# Patient Record
Sex: FEMALE | Employment: OTHER
[De-identification: names, ages, dates, MRNs, and addresses within clinical notes are randomized per-mention and may not be internally consistent; named-entity substitution may affect disease eponyms.]

---

## 2020-10-01 ENCOUNTER — OFFICE VISIT (OUTPATIENT)
Age: 70
End: 2020-10-01

## 2020-10-13 ENCOUNTER — OFFICE VISIT (OUTPATIENT)
Dept: URBAN - METROPOLITAN AREA CLINIC 9 | Facility: CLINIC | Age: 70
End: 2020-10-13

## 2021-02-23 ENCOUNTER — TELEPHONE ENCOUNTER (OUTPATIENT)
Dept: URBAN - METROPOLITAN AREA CLINIC 9 | Facility: CLINIC | Age: 71
End: 2021-02-23

## 2021-02-24 ENCOUNTER — TELEPHONE ENCOUNTER (OUTPATIENT)
Dept: URBAN - METROPOLITAN AREA CLINIC 9 | Facility: CLINIC | Age: 71
End: 2021-02-24

## 2021-02-25 ENCOUNTER — OFFICE VISIT (OUTPATIENT)
Dept: URBAN - METROPOLITAN AREA CLINIC 9 | Facility: CLINIC | Age: 71
End: 2021-02-25

## 2021-02-25 ENCOUNTER — TELEPHONE ENCOUNTER (OUTPATIENT)
Dept: URBAN - METROPOLITAN AREA CLINIC 9 | Facility: CLINIC | Age: 71
End: 2021-02-25

## 2021-03-08 ENCOUNTER — TELEPHONE ENCOUNTER (OUTPATIENT)
Dept: URBAN - METROPOLITAN AREA CLINIC 9 | Facility: CLINIC | Age: 71
End: 2021-03-08

## 2021-04-03 ENCOUNTER — TELEPHONE ENCOUNTER (OUTPATIENT)
Dept: URBAN - METROPOLITAN AREA CLINIC 9 | Facility: CLINIC | Age: 71
End: 2021-04-03

## 2021-04-05 ENCOUNTER — TELEPHONE ENCOUNTER (OUTPATIENT)
Dept: URBAN - METROPOLITAN AREA CLINIC 9 | Facility: CLINIC | Age: 71
End: 2021-04-05

## 2021-04-05 ENCOUNTER — OFFICE VISIT (OUTPATIENT)
Dept: URBAN - METROPOLITAN AREA SURGERY CENTER 9 | Facility: SURGERY CENTER | Age: 71
End: 2021-04-05

## 2021-04-26 ENCOUNTER — OFFICE VISIT (OUTPATIENT)
Dept: URBAN - METROPOLITAN AREA SURGERY CENTER 9 | Facility: SURGERY CENTER | Age: 71
End: 2021-04-26

## 2021-06-03 ENCOUNTER — OFFICE VISIT (OUTPATIENT)
Dept: URBAN - METROPOLITAN AREA CLINIC 9 | Facility: CLINIC | Age: 71
End: 2021-06-03

## 2021-07-08 ENCOUNTER — TELEPHONE ENCOUNTER (OUTPATIENT)
Dept: URBAN - METROPOLITAN AREA CLINIC 9 | Facility: CLINIC | Age: 71
End: 2021-07-08

## 2022-07-30 ENCOUNTER — TELEPHONE ENCOUNTER (OUTPATIENT)
Age: 72
End: 2022-07-30

## 2022-07-30 RX ORDER — CIPROFLOXACIN HYDROCHLORIDE 500 MG/1
1 (ONE) TABLET, FILM COATED ORAL BID
Qty: 0 | Refills: 2 | OUTPATIENT
Start: 2021-02-24 | End: 2021-03-03

## 2022-07-30 RX ORDER — ALUMINUM HYDROXIDE AND MAGNESIUM CARBONATE 95; 358 MG/15ML; MG/15ML
15ML LIQUID ORAL
Qty: 0 | Refills: 2 | OUTPATIENT
Start: 2017-03-27 | End: 2017-04-26

## 2022-07-30 RX ORDER — PANTOPRAZOLE SODIUM 40 MG/1
1 (ONE) TABLET, DELAYED RELEASE ORAL DAILY
Qty: 0 | Refills: 3 | OUTPATIENT
Start: 2020-10-13 | End: 2021-06-03

## 2022-07-30 RX ORDER — FAMOTIDINE 10 MG
1 (ONE) TABLET ORAL
Qty: 0 | Refills: 16 | OUTPATIENT
Start: 2016-07-01 | End: 2016-07-18

## 2022-07-30 RX ORDER — METRONIDAZOLE 375 MG/1
1 (ONE) CAPSULE ORAL TID
Qty: 0 | Refills: 2 | OUTPATIENT
Start: 2021-02-24 | End: 2021-03-03

## 2022-07-30 RX ORDER — FAMOTIDINE 10 MG
1 (ONE) TABLET ORAL TWICE A DAY
Qty: 0 | Refills: 16 | OUTPATIENT
Start: 2017-05-31 | End: 2020-10-13

## 2022-07-30 RX ORDER — PANTOPRAZOLE SODIUM 40 MG/1
1 (ONE) TABLET, DELAYED RELEASE ORAL
Qty: 0 | Refills: 16 | OUTPATIENT
Start: 2017-03-27 | End: 2017-03-27

## 2022-07-31 ENCOUNTER — TELEPHONE ENCOUNTER (OUTPATIENT)
Age: 72
End: 2022-07-31

## 2022-07-31 RX ORDER — METRONIDAZOLE 375 MG/1
1 (ONE) CAPSULE ORAL TID
Qty: 0 | Refills: 2 | Status: ACTIVE | COMMUNITY
Start: 2021-02-24

## 2022-07-31 RX ORDER — FAMOTIDINE 10 MG
1 (ONE) TABLET ORAL TWICE A DAY
Qty: 0 | Refills: 16 | Status: ACTIVE | COMMUNITY
Start: 2017-05-31

## 2022-07-31 RX ORDER — PANTOPRAZOLE SODIUM 40 MG/1
1 (ONE) TABLET, DELAYED RELEASE ORAL
Qty: 0 | Refills: 16 | Status: ACTIVE | COMMUNITY
Start: 2017-02-24

## 2022-07-31 RX ORDER — FAMOTIDINE 10 MG
1 (ONE) TABLET ORAL AT BEDTIME
Qty: 0 | Refills: 16 | Status: ACTIVE | COMMUNITY
Start: 2017-02-24

## 2022-07-31 RX ORDER — ALUMINUM HYDROXIDE AND MAGNESIUM CARBONATE 95; 358 MG/15ML; MG/15ML
15ML LIQUID ORAL
Qty: 0 | Refills: 2 | Status: ACTIVE | COMMUNITY
Start: 2017-03-27

## 2022-07-31 RX ORDER — FAMOTIDINE 10 MG
1 (ONE) TABLET ORAL
Qty: 0 | Refills: 16 | Status: ACTIVE | COMMUNITY
Start: 2016-07-01

## 2022-07-31 RX ORDER — CIPROFLOXACIN HYDROCHLORIDE 500 MG/1
1 (ONE) TABLET, FILM COATED ORAL BID
Qty: 0 | Refills: 2 | Status: ACTIVE | COMMUNITY
Start: 2021-02-24

## 2022-07-31 RX ORDER — PANTOPRAZOLE SODIUM 40 MG/1
1 (ONE) TABLET, DELAYED RELEASE ORAL DAILY
Qty: 0 | Refills: 3 | Status: ACTIVE | COMMUNITY
Start: 2020-10-13

## 2022-07-31 RX ORDER — FAMOTIDINE 10 MG
1 (ONE) TABLET ORAL TWICE A DAY
Qty: 0 | Refills: 16 | Status: ACTIVE | COMMUNITY
Start: 2017-03-27

## 2022-07-31 RX ORDER — PANTOPRAZOLE 20 MG/1
1 (ONE) TABLET, DELAYED RELEASE ORAL
Qty: 0 | Refills: 16 | Status: ACTIVE | COMMUNITY
Start: 2016-07-18

## 2022-12-30 ENCOUNTER — TELEPHONE ENCOUNTER (OUTPATIENT)
Dept: URBAN - METROPOLITAN AREA CLINIC 9 | Facility: CLINIC | Age: 72
End: 2022-12-30

## 2023-02-06 ENCOUNTER — OFFICE VISIT (OUTPATIENT)
Dept: URBAN - METROPOLITAN AREA CLINIC 9 | Facility: CLINIC | Age: 73
End: 2023-02-06
Payer: MEDICARE

## 2023-02-06 ENCOUNTER — DASHBOARD ENCOUNTERS (OUTPATIENT)
Age: 73
End: 2023-02-06

## 2023-02-06 VITALS
DIASTOLIC BLOOD PRESSURE: 82 MMHG | WEIGHT: 165 LBS | BODY MASS INDEX: 29.23 KG/M2 | SYSTOLIC BLOOD PRESSURE: 132 MMHG | HEIGHT: 63 IN

## 2023-02-06 DIAGNOSIS — R10.9 ABDOMINAL PAIN: ICD-10-CM

## 2023-02-06 DIAGNOSIS — K52.9 CHRONIC DIARRHEA: ICD-10-CM

## 2023-02-06 PROCEDURE — 99204 OFFICE O/P NEW MOD 45 MIN: CPT | Performed by: INTERNAL MEDICINE

## 2023-02-06 RX ORDER — ROSUVASTATIN CALCIUM 10 MG/1
TABLET, FILM COATED ORAL
Qty: 90 TABLET | Status: ACTIVE | COMMUNITY

## 2023-02-06 RX ORDER — CIPROFLOXACIN HYDROCHLORIDE 500 MG/1
1 (ONE) TABLET, FILM COATED ORAL BID
Qty: 0 | Refills: 2 | Status: ON HOLD | COMMUNITY
Start: 2021-02-24

## 2023-02-06 RX ORDER — SODIUM, POTASSIUM,MAG SULFATES 17.5-3.13G
177ML SOLUTION, RECONSTITUTED, ORAL ORAL ONCE
Qty: 1 | Refills: 0 | OUTPATIENT
Start: 2023-02-06 | End: 2023-02-07

## 2023-02-06 RX ORDER — PANTOPRAZOLE SODIUM 40 MG/1
1 (ONE) TABLET, DELAYED RELEASE ORAL
Qty: 0 | Refills: 16 | Status: ACTIVE | COMMUNITY
Start: 2017-02-24

## 2023-02-06 RX ORDER — FLUTICASONE PROPIONATE 50 UG/1
SPRAY 2 SPRAYS BY NASAL ROUTE DAILY SPRAY, METERED NASAL
Qty: 16 GRAM | Refills: 0 | Status: ACTIVE | COMMUNITY

## 2023-02-06 RX ORDER — METRONIDAZOLE 375 MG/1
1 (ONE) CAPSULE ORAL TID
Qty: 0 | Refills: 2 | Status: ON HOLD | COMMUNITY
Start: 2021-02-24

## 2023-02-06 RX ORDER — PANTOPRAZOLE 20 MG/1
1 (ONE) TABLET, DELAYED RELEASE ORAL
Qty: 0 | Refills: 16 | Status: ON HOLD | COMMUNITY
Start: 2016-07-18

## 2023-02-06 RX ORDER — ALUMINUM HYDROXIDE AND MAGNESIUM CARBONATE 95; 358 MG/15ML; MG/15ML
15ML LIQUID ORAL
Qty: 0 | Refills: 2 | Status: ON HOLD | COMMUNITY
Start: 2017-03-27

## 2023-02-06 RX ORDER — FAMOTIDINE 10 MG
1 (ONE) TABLET ORAL TWICE A DAY
Qty: 0 | Refills: 16 | Status: ON HOLD | COMMUNITY
Start: 2017-03-27

## 2023-02-06 NOTE — HPI-TODAY'S VISIT:
72-year-old here for 2 separate complaints.  She says she has this kind of chronic left lower quadrant abdominal discomfort.  Is really just discomfort and she drinks a lot of water to try to avoid there being pain there.  She was recently put on antibiotics for possible diverticulitis although there was no imaging I think recently.  She just kind of has this intermittent left lower quadrant discomfort.  She says once a week she will get these "flares ".  Where she has loose stools up to 10 times a day and urgency.  No new medications.  No weight loss no hematochezia.

## 2023-02-08 ENCOUNTER — TELEPHONE ENCOUNTER (OUTPATIENT)
Dept: URBAN - METROPOLITAN AREA CLINIC 9 | Facility: CLINIC | Age: 73
End: 2023-02-08

## 2023-02-08 ENCOUNTER — WEB ENCOUNTER (OUTPATIENT)
Dept: URBAN - METROPOLITAN AREA SURGERY CENTER 9 | Facility: SURGERY CENTER | Age: 73
End: 2023-02-08

## 2023-02-10 ENCOUNTER — TELEPHONE ENCOUNTER (OUTPATIENT)
Dept: URBAN - METROPOLITAN AREA CLINIC 7 | Facility: CLINIC | Age: 73
End: 2023-02-10

## 2023-02-10 LAB
DEAMIDATED GLIADIN ABS, IGA: 6
DEAMIDATED GLIADIN ABS, IGG: 4
ENDOMYSIAL ANTIBODY IGA: NEGATIVE
IMMUNOGLOBULIN A, QN, SERUM: 198
T-TRANSGLUTAMINASE (TTG) IGA: <2
T-TRANSGLUTAMINASE (TTG) IGG: 1

## 2023-02-14 ENCOUNTER — OFFICE VISIT (OUTPATIENT)
Dept: URBAN - METROPOLITAN AREA SURGERY CENTER 9 | Facility: SURGERY CENTER | Age: 73
End: 2023-02-14
Payer: MEDICARE

## 2023-02-14 ENCOUNTER — CLAIMS CREATED FROM THE CLAIM WINDOW (OUTPATIENT)
Dept: URBAN - METROPOLITAN AREA CLINIC 4 | Facility: CLINIC | Age: 73
End: 2023-02-14
Payer: MEDICARE

## 2023-02-14 DIAGNOSIS — R19.7 ACUTE DIARRHEA: ICD-10-CM

## 2023-02-14 DIAGNOSIS — K64.8 EXTERNAL HEMORRHOIDS: ICD-10-CM

## 2023-02-14 DIAGNOSIS — K63.89 OTHER SPECIFIED DISEASES OF INTESTINE: ICD-10-CM

## 2023-02-14 DIAGNOSIS — D17.5 BENIGN LIPOMATOUS NEOPLASM OF INTRA-ABDOMINAL ORGANS: ICD-10-CM

## 2023-02-14 DIAGNOSIS — D17.9 BENIGN LIPOMATOUS NEOPLASM, UNSPECIFIED: ICD-10-CM

## 2023-02-14 PROCEDURE — 43239 EGD BIOPSY SINGLE/MULTIPLE: CPT | Performed by: INTERNAL MEDICINE

## 2023-02-14 PROCEDURE — 88313 SPECIAL STAINS GROUP 2: CPT | Performed by: PATHOLOGY

## 2023-02-14 PROCEDURE — 88305 TISSUE EXAM BY PATHOLOGIST: CPT | Performed by: PATHOLOGY

## 2023-02-14 PROCEDURE — 88342 IMHCHEM/IMCYTCHM 1ST ANTB: CPT | Performed by: PATHOLOGY

## 2023-02-14 RX ORDER — FAMOTIDINE 10 MG
1 (ONE) TABLET ORAL TWICE A DAY
Qty: 0 | Refills: 16 | Status: ON HOLD | COMMUNITY
Start: 2017-03-27

## 2023-02-14 RX ORDER — PANTOPRAZOLE 20 MG/1
1 (ONE) TABLET, DELAYED RELEASE ORAL
Qty: 0 | Refills: 16 | Status: ON HOLD | COMMUNITY
Start: 2016-07-18

## 2023-02-14 RX ORDER — METRONIDAZOLE 375 MG/1
1 (ONE) CAPSULE ORAL TID
Qty: 0 | Refills: 2 | Status: ON HOLD | COMMUNITY
Start: 2021-02-24

## 2023-02-14 RX ORDER — FLUTICASONE PROPIONATE 50 UG/1
SPRAY 2 SPRAYS BY NASAL ROUTE DAILY SPRAY, METERED NASAL
Qty: 16 GRAM | Refills: 0 | Status: ACTIVE | COMMUNITY

## 2023-02-14 RX ORDER — ALUMINUM HYDROXIDE AND MAGNESIUM CARBONATE 95; 358 MG/15ML; MG/15ML
15ML LIQUID ORAL
Qty: 0 | Refills: 2 | Status: ON HOLD | COMMUNITY
Start: 2017-03-27

## 2023-02-14 RX ORDER — PANTOPRAZOLE SODIUM 40 MG/1
1 (ONE) TABLET, DELAYED RELEASE ORAL
Qty: 0 | Refills: 16 | Status: ACTIVE | COMMUNITY
Start: 2017-02-24

## 2023-02-14 RX ORDER — ROSUVASTATIN CALCIUM 10 MG/1
TABLET, FILM COATED ORAL
Qty: 90 TABLET | Status: ACTIVE | COMMUNITY

## 2023-02-14 RX ORDER — CIPROFLOXACIN HYDROCHLORIDE 500 MG/1
1 (ONE) TABLET, FILM COATED ORAL BID
Qty: 0 | Refills: 2 | Status: ON HOLD | COMMUNITY
Start: 2021-02-24

## 2024-08-26 ENCOUNTER — P2P PATIENT RECORD (OUTPATIENT)
Age: 74
End: 2024-08-26

## 2024-09-19 ENCOUNTER — OFFICE VISIT (OUTPATIENT)
Dept: URBAN - METROPOLITAN AREA CLINIC 9 | Facility: CLINIC | Age: 74
End: 2024-09-19
Payer: MEDICARE

## 2024-09-19 VITALS
SYSTOLIC BLOOD PRESSURE: 101 MMHG | WEIGHT: 167.4 LBS | BODY MASS INDEX: 29.66 KG/M2 | HEIGHT: 63 IN | DIASTOLIC BLOOD PRESSURE: 62 MMHG

## 2024-09-19 DIAGNOSIS — K21.9 GASTROESOPHAGEAL REFLUX DISEASE: ICD-10-CM

## 2024-09-19 DIAGNOSIS — R13.10 DYSPHAGIA: ICD-10-CM

## 2024-09-19 PROCEDURE — 99214 OFFICE O/P EST MOD 30 MIN: CPT | Performed by: INTERNAL MEDICINE

## 2024-09-19 RX ORDER — ROSUVASTATIN CALCIUM 10 MG/1
1 TABLET TABLET, FILM COATED ORAL ONCE A DAY
Qty: 90 TABLET | Status: ACTIVE | COMMUNITY

## 2024-09-19 RX ORDER — SERTRALINE 50 MG/1
1 TABLET TABLET, FILM COATED ORAL ONCE A DAY
Status: ACTIVE | COMMUNITY

## 2024-09-19 RX ORDER — CIPROFLOXACIN HYDROCHLORIDE 500 MG/1
1 (ONE) TABLET, FILM COATED ORAL BID
Qty: 0 | Refills: 2 | Status: DISCONTINUED | COMMUNITY
Start: 2021-02-24

## 2024-09-19 RX ORDER — METRONIDAZOLE 375 MG/1
1 (ONE) CAPSULE ORAL TID
Qty: 0 | Refills: 2 | Status: DISCONTINUED | COMMUNITY
Start: 2021-02-24

## 2024-09-19 RX ORDER — FAMOTIDINE 10 MG
1 (ONE) TABLET ORAL TWICE A DAY
Qty: 0 | Refills: 16 | Status: DISCONTINUED | COMMUNITY
Start: 2017-03-27

## 2024-09-19 RX ORDER — VIT A/VIT C/VIT E/ZINC/COPPER 4296-226
AS DIRECTED CAPSULE ORAL ONCE DAILY
Status: ACTIVE | COMMUNITY

## 2024-09-19 RX ORDER — PANTOPRAZOLE 20 MG/1
1 (ONE) TABLET, DELAYED RELEASE ORAL
Qty: 0 | Refills: 16 | Status: DISCONTINUED | COMMUNITY
Start: 2016-07-18

## 2024-09-19 RX ORDER — PANTOPRAZOLE SODIUM 40 MG/1
1 (ONE) TABLET, DELAYED RELEASE ORAL
Qty: 0 | Refills: 16 | Status: ACTIVE | COMMUNITY
Start: 2017-02-24

## 2024-09-19 RX ORDER — FAMOTIDINE 40 MG/1
1 TABLET AT BEDTIME TABLET, FILM COATED ORAL ONCE A DAY
Qty: 90 TABLET | Refills: 3 | OUTPATIENT
Start: 2024-09-19

## 2024-09-19 RX ORDER — ALUMINUM HYDROXIDE AND MAGNESIUM CARBONATE 95; 358 MG/15ML; MG/15ML
15ML LIQUID ORAL
Qty: 0 | Refills: 2 | Status: DISCONTINUED | COMMUNITY
Start: 2017-03-27

## 2024-09-19 RX ORDER — PANTOPRAZOLE SODIUM 40 MG/1
1 (ONE) TABLET, DELAYED RELEASE ORAL
OUTPATIENT
Start: 2017-02-24

## 2024-09-19 RX ORDER — FLUTICASONE PROPIONATE 50 UG/1
SPRAY 2 SPRAYS BY NASAL ROUTE DAILY SPRAY, METERED NASAL
Qty: 16 GRAM | Refills: 0 | Status: DISCONTINUED | COMMUNITY

## 2024-09-19 NOTE — HPI-TODAY'S VISIT:
Pt here for f/u of GERD and for chronic cough that has worsened . 2016 EGD with stevenson's but bx neg 2016 Colon neg 2017 EGD with stevenson's but bx neg 2021 Colon neg done for diverticulitis 2022 COlon neg done for chronic diarrhea, repeat 10 years . 2024 CBC, BMP, lfts reviewed and neg . Pt is on pantoprazole 40 mg every AM but despite that still has GERD 1x per week. She has some dysphagia to solids. She has not had an EGD since 2017 and despite her bx were neg for Stevenson's I am concerned particularly with her dysphagia that she may have a stricture or hernia. Will plan EGD and dil. Cont pantoprazole and add QHS famotidine. RTC pending the above and decide next steps pending EGD. She may need a different PPI but I do believe her cough is due to GERD. RTC 1 year at this point. .

## 2024-09-25 ENCOUNTER — CLAIMS CREATED FROM THE CLAIM WINDOW (OUTPATIENT)
Dept: URBAN - METROPOLITAN AREA SURGERY CENTER 9 | Facility: SURGERY CENTER | Age: 74
End: 2024-09-25
Payer: MEDICARE

## 2024-09-25 ENCOUNTER — CLAIMS CREATED FROM THE CLAIM WINDOW (OUTPATIENT)
Dept: URBAN - METROPOLITAN AREA SURGERY CENTER 9 | Facility: SURGERY CENTER | Age: 74
End: 2024-09-25

## 2024-09-25 DIAGNOSIS — K29.70 GASTRITIS WITHOUT BLEEDING, UNSPECIFIED CHRONICITY, UNSPECIFIED GASTRITIS TYPE: ICD-10-CM

## 2024-09-25 DIAGNOSIS — K44.9 DIAPHRAGMATIC HERNIA WITHOUT OBSTRUCTION OR GANGRENE: ICD-10-CM

## 2024-09-25 DIAGNOSIS — K31.7 POLYP OF STOMACH AND DUODENUM: ICD-10-CM

## 2024-09-25 DIAGNOSIS — K22.89 OTHER SPECIFIED DISEASE OF ESOPHAGUS: ICD-10-CM

## 2024-09-25 PROCEDURE — 43239 EGD BIOPSY SINGLE/MULTIPLE: CPT | Performed by: INTERNAL MEDICINE

## 2024-09-25 PROCEDURE — 43248 EGD GUIDE WIRE INSERTION: CPT | Performed by: INTERNAL MEDICINE

## 2024-09-25 RX ORDER — FAMOTIDINE 40 MG/1
1 TABLET AT BEDTIME TABLET, FILM COATED ORAL ONCE A DAY
Qty: 90 TABLET | Refills: 3 | Status: ACTIVE | COMMUNITY
Start: 2024-09-19

## 2024-09-25 RX ORDER — SERTRALINE 50 MG/1
1 TABLET TABLET, FILM COATED ORAL ONCE A DAY
Status: ACTIVE | COMMUNITY

## 2024-09-25 RX ORDER — ROSUVASTATIN CALCIUM 10 MG/1
1 TABLET TABLET, FILM COATED ORAL ONCE A DAY
Qty: 90 TABLET | Status: ACTIVE | COMMUNITY

## 2024-09-25 RX ORDER — PANTOPRAZOLE SODIUM 40 MG/1
1 (ONE) TABLET, DELAYED RELEASE ORAL
Status: ACTIVE | COMMUNITY
Start: 2017-02-24

## 2024-09-25 RX ORDER — VIT A/VIT C/VIT E/ZINC/COPPER 4296-226
AS DIRECTED CAPSULE ORAL ONCE DAILY
Status: ACTIVE | COMMUNITY

## 2024-10-25 ENCOUNTER — OFFICE VISIT (OUTPATIENT)
Dept: URBAN - METROPOLITAN AREA CLINIC 9 | Facility: CLINIC | Age: 74
End: 2024-10-25

## 2024-12-02 ENCOUNTER — NEW PATIENT (OUTPATIENT)
Age: 74
End: 2024-12-02

## 2024-12-02 VITALS — HEIGHT: 63 IN | BODY MASS INDEX: 28.88 KG/M2 | WEIGHT: 163 LBS

## 2024-12-02 DIAGNOSIS — H02.834: ICD-10-CM

## 2024-12-02 DIAGNOSIS — H35.3132: ICD-10-CM

## 2024-12-02 DIAGNOSIS — H02.831: ICD-10-CM

## 2024-12-02 DIAGNOSIS — H04.123: ICD-10-CM

## 2024-12-02 PROCEDURE — 92250 FUNDUS PHOTOGRAPHY W/I&R: CPT

## 2024-12-02 PROCEDURE — 92004 COMPRE OPH EXAM NEW PT 1/>: CPT

## 2024-12-02 PROCEDURE — 92134 CPTRZ OPH DX IMG PST SGM RTA: CPT
